# Patient Record
Sex: FEMALE | Race: WHITE | NOT HISPANIC OR LATINO | Employment: OTHER | URBAN - METROPOLITAN AREA
[De-identification: names, ages, dates, MRNs, and addresses within clinical notes are randomized per-mention and may not be internally consistent; named-entity substitution may affect disease eponyms.]

---

## 2023-11-15 DIAGNOSIS — I10 BENIGN ESSENTIAL HYPERTENSION: ICD-10-CM

## 2023-11-15 DIAGNOSIS — I25.10 ATHEROSCLEROSIS OF NATIVE CORONARY ARTERY OF NATIVE HEART WITHOUT ANGINA PECTORIS: ICD-10-CM

## 2023-11-17 ENCOUNTER — OFFICE VISIT (OUTPATIENT)
Dept: CARDIOLOGY | Facility: CLINIC | Age: 83
End: 2023-11-17
Payer: MEDICARE

## 2023-11-17 VITALS
WEIGHT: 203 LBS | HEIGHT: 63 IN | TEMPERATURE: 97.3 F | BODY MASS INDEX: 35.97 KG/M2 | HEART RATE: 62 BPM | DIASTOLIC BLOOD PRESSURE: 72 MMHG | SYSTOLIC BLOOD PRESSURE: 120 MMHG

## 2023-11-17 DIAGNOSIS — E78.2 MIXED HYPERLIPIDEMIA: ICD-10-CM

## 2023-11-17 DIAGNOSIS — I25.10 ATHEROSCLEROSIS OF NATIVE CORONARY ARTERY OF NATIVE HEART WITHOUT ANGINA PECTORIS: Primary | ICD-10-CM

## 2023-11-17 DIAGNOSIS — R09.89 BRUIT OF RIGHT CAROTID ARTERY: ICD-10-CM

## 2023-11-17 DIAGNOSIS — I10 BENIGN ESSENTIAL HYPERTENSION: ICD-10-CM

## 2023-11-17 DIAGNOSIS — Z95.5 PRESENCE OF DRUG COATED STENT IN LAD CORONARY ARTERY: ICD-10-CM

## 2023-11-17 DIAGNOSIS — Z78.9 NONSMOKER: ICD-10-CM

## 2023-11-17 DIAGNOSIS — R53.82 CHRONIC FATIGUE: ICD-10-CM

## 2023-11-17 PROBLEM — R53.83 FATIGUE: Status: ACTIVE | Noted: 2023-11-17

## 2023-11-17 PROBLEM — E04.1 THYROID NODULE: Status: ACTIVE | Noted: 2023-11-17

## 2023-11-17 PROBLEM — I35.9 AORTIC VALVE CALCIFICATION: Status: ACTIVE | Noted: 2023-11-17

## 2023-11-17 PROBLEM — R41.82 ALTERED MENTAL STATUS: Status: ACTIVE | Noted: 2020-06-19

## 2023-11-17 PROBLEM — G47.30 SLEEP APNEA: Status: ACTIVE | Noted: 2023-11-17

## 2023-11-17 PROBLEM — R00.1 SINUS BRADYCARDIA: Status: ACTIVE | Noted: 2023-11-17

## 2023-11-17 PROBLEM — E78.5 HYPERLIPIDEMIA: Status: ACTIVE | Noted: 2023-11-17

## 2023-11-17 PROBLEM — F33.9 RECURRENT MAJOR DEPRESSIVE DISORDER (CMS-HCC): Status: ACTIVE | Noted: 2023-11-17

## 2023-11-17 PROCEDURE — 1036F TOBACCO NON-USER: CPT | Performed by: INTERNAL MEDICINE

## 2023-11-17 PROCEDURE — 3074F SYST BP LT 130 MM HG: CPT | Performed by: INTERNAL MEDICINE

## 2023-11-17 PROCEDURE — 1159F MED LIST DOCD IN RCRD: CPT | Performed by: INTERNAL MEDICINE

## 2023-11-17 PROCEDURE — 99214 OFFICE O/P EST MOD 30 MIN: CPT | Performed by: INTERNAL MEDICINE

## 2023-11-17 PROCEDURE — 93000 ELECTROCARDIOGRAM COMPLETE: CPT | Performed by: INTERNAL MEDICINE

## 2023-11-17 PROCEDURE — 3078F DIAST BP <80 MM HG: CPT | Performed by: INTERNAL MEDICINE

## 2023-11-17 PROCEDURE — 1160F RVW MEDS BY RX/DR IN RCRD: CPT | Performed by: INTERNAL MEDICINE

## 2023-11-17 RX ORDER — ALPHA LIPOIC ACID 100 MG
10 CAPSULE ORAL DAILY
COMMUNITY

## 2023-11-17 RX ORDER — POTASSIUM CHLORIDE 750 MG/1
1 TABLET, EXTENDED RELEASE ORAL EVERY OTHER DAY
COMMUNITY
Start: 2022-07-26

## 2023-11-17 RX ORDER — ROSUVASTATIN CALCIUM 40 MG/1
40 TABLET, COATED ORAL DAILY
COMMUNITY
Start: 2021-11-16 | End: 2023-11-17 | Stop reason: SDUPTHER

## 2023-11-17 RX ORDER — ROSUVASTATIN CALCIUM 40 MG/1
40 TABLET, COATED ORAL DAILY
Qty: 90 TABLET | Refills: 3 | Status: SHIPPED | OUTPATIENT
Start: 2023-11-17 | End: 2024-11-16

## 2023-11-17 RX ORDER — DILTIAZEM HYDROCHLORIDE 180 MG/1
180 CAPSULE, EXTENDED RELEASE ORAL DAILY
Qty: 90 CAPSULE | Refills: 3 | Status: SHIPPED | OUTPATIENT
Start: 2023-11-17 | End: 2024-11-16

## 2023-11-17 RX ORDER — MINERAL OIL
180 ENEMA (ML) RECTAL
COMMUNITY

## 2023-11-17 RX ORDER — DILTIAZEM HYDROCHLORIDE 180 MG/1
1 CAPSULE, EXTENDED RELEASE ORAL DAILY
COMMUNITY
Start: 2022-02-06 | End: 2023-11-17 | Stop reason: SDUPTHER

## 2023-11-17 RX ORDER — BUSPIRONE HYDROCHLORIDE 10 MG/1
1 TABLET ORAL 2 TIMES DAILY
COMMUNITY
Start: 2017-10-02

## 2023-11-17 RX ORDER — MULTIVITAMIN
1 TABLET ORAL
COMMUNITY
Start: 2012-01-06

## 2023-11-17 RX ORDER — LOSARTAN POTASSIUM 50 MG/1
50 TABLET ORAL
Qty: 90 TABLET | Refills: 3 | Status: SHIPPED | OUTPATIENT
Start: 2023-11-17 | End: 2023-12-21 | Stop reason: WASHOUT

## 2023-11-17 RX ORDER — CALCIUM CARBONATE/VITAMIN D3 600MG-5MCG
1 TABLET ORAL 2 TIMES DAILY
COMMUNITY
Start: 2012-01-06

## 2023-11-17 RX ORDER — NITROGLYCERIN 0.4 MG/1
0.4 TABLET SUBLINGUAL EVERY 5 MIN PRN
COMMUNITY
Start: 2012-01-06

## 2023-11-17 RX ORDER — FLUOXETINE HYDROCHLORIDE 20 MG/1
20 CAPSULE ORAL
COMMUNITY
Start: 2012-04-09

## 2023-11-17 RX ORDER — LOSARTAN POTASSIUM 50 MG/1
50 TABLET ORAL
COMMUNITY
End: 2023-11-17 | Stop reason: SDUPTHER

## 2023-11-17 RX ORDER — FUROSEMIDE 20 MG/1
1 TABLET ORAL EVERY OTHER DAY
COMMUNITY
Start: 2022-07-26

## 2023-11-17 RX ORDER — ASPIRIN 81 MG/1
81 TABLET ORAL DAILY
Qty: 90 TABLET | Refills: 3 | Status: SHIPPED | OUTPATIENT
Start: 2023-11-17 | End: 2024-11-16

## 2023-11-17 RX ORDER — ASPIRIN 81 MG/1
1 TABLET ORAL DAILY
COMMUNITY
End: 2023-11-17 | Stop reason: SDUPTHER

## 2023-11-17 RX ORDER — FAMOTIDINE 20 MG/1
20 TABLET, FILM COATED ORAL 2 TIMES DAILY
COMMUNITY
Start: 2017-11-07

## 2023-11-17 RX ORDER — ALPRAZOLAM 0.25 MG/1
0.25 TABLET ORAL 3 TIMES DAILY
COMMUNITY
Start: 2012-01-06

## 2023-11-17 ASSESSMENT — ENCOUNTER SYMPTOMS
DYSURIA: 0
FATIGUE: 1
ARTHRALGIAS: 1
HEMATURIA: 0
ACTIVITY CHANGE: 1
NEUROLOGICAL NEGATIVE: 1
EYES NEGATIVE: 1
PSYCHIATRIC NEGATIVE: 1
FREQUENCY: 1
CARDIOVASCULAR NEGATIVE: 1
RESPIRATORY NEGATIVE: 1
GASTROINTESTINAL NEGATIVE: 1
HEMATOLOGIC/LYMPHATIC NEGATIVE: 1
CHILLS: 0
FEVER: 0

## 2023-11-17 ASSESSMENT — PATIENT HEALTH QUESTIONNAIRE - PHQ9
1. LITTLE INTEREST OR PLEASURE IN DOING THINGS: NOT AT ALL
SUM OF ALL RESPONSES TO PHQ9 QUESTIONS 1 AND 2: 0
2. FEELING DOWN, DEPRESSED OR HOPELESS: NOT AT ALL

## 2023-11-17 NOTE — PROGRESS NOTES
Patient:  Bess Kathleen  YOB: 1940  MRN: [id number]       Chief Complaint/Active Symptoms:       Bess Kathleen is a 83 y.o. female who returns today for cardiac follow-up.    Here for annual follow-up. No hospitalizations or testing other than labs in the past year.     No chest pain or discomfort. No shortness of breath. Does not exercise. No edema, no palpitations, no dizziness or lightheadedness.     No questions or concerns. They are looking for a cardiologist closer to their home in [place].  Patient's daughter brought lab work from her [facility] physician that included a CMP lipid panel and CBC.  No concerning abnormalities were present in those labs    Review of Systems   Constitutional:  Positive for activity change and fatigue. Negative for chills and fever.   HENT:  Positive for hearing loss.    Eyes: Negative.    Respiratory: Negative.     Cardiovascular: Negative.    Gastrointestinal: Negative.    Genitourinary:  Positive for frequency. Negative for dysuria and hematuria.   Musculoskeletal:  Positive for arthralgias.   Skin: Negative.    Neurological: Negative.    Hematological: Negative.    Psychiatric/Behavioral: Negative.     All other systems reviewed and are negative.      Objective:     Vitals:    11/17/23 1403   BP: 120/72   Pulse: 62   Temp: 36.3 °C (97.3 °F)       Vitals:    11/17/23 1403   Weight: 92.1 kg (203 lb)       Allergies:     No Known Allergies       Medications:     Current Outpatient Medications   Medication Instructions    alpha lipoic acid 10 mg, oral, Daily    ALPRAZolam (XANAX) 0.25 mg, oral, 3 times daily    aspirin 81 mg, oral, Daily    busPIRone (Buspar) 10 mg tablet 1 tablet, oral, 2 times daily    calcium carbonate-vitamin D3 600 mg-5 mcg (200 unit) tablet 1 tablet, oral, 2 times daily    DILT- mg, oral, Daily    famotidine (PEPCID) 20 mg, oral, 2 times daily    fexofenadine (ALLEGRA ALLERGY) 180 mg, oral, Daily RT    FLUoxetine  "(PROZAC) 20 mg, oral, Daily RT    furosemide (Lasix) 20 mg tablet 1 tablet, oral, Every other day    losartan (COZAAR) 50 mg, oral, Daily RT    multivitamin tablet 1 tablet, oral, Daily RT    nitroglycerin (NITROSTAT) 0.4 mg, sublingual, Every 5 min PRN    potassium chloride CR 10 mEq ER tablet 1 tablet, oral, Every other day    rosuvastatin (CRESTOR) 40 mg, oral, Daily       Physical Examination:   GENERAL:  Well developed, well nourished, in no acute distress.  HEENT: NC AT, EOMI with anicteric sclera  NECK: Range of motion with mild kyphosis, no JVD, soft right carotid bruit   CHEST:  Symmetric and nontender.  LUNGS:  Clear to auscultation bilaterally, normal respiratory effort.  HEART:  PMI is nondisplaced. RRR with normal S1 and S2, no S3, no mumur or rub. No  abdominal bruits  ABDOMEN: Soft, NT, ND without palpable organomegaly or bruits  EXTREMITIES:  Warm with good color, no clubbing or cyanosis.  There is no edema noted.  PERIPHERAL VASCULAR:  Pulses present and equally palpable; 2+ throughout.  MUSCULOSKELETAL: Arthritic changes present  NEURO/PSYCH:  Alert and oriented times three with approppriate behavior and responses. Nonfocal motor examination with normal gait and ambulation  Lymph: No significant palpable lymphadenopathy  Skin: no rash or lesions on exposed skin or reported.    Lab:     CBC:   No results found for: \"WBC\", \"RBC\", \"HGB\", \"HCT\", \"PLT\"     CMP:    No results found for: \"NA\", \"K\", \"CL\", \"CO2\", \"BUN\", \"CREATININE\", \"AGRATIO\", \"GLUCOSE\", \"GLU\", \"CALCIUM\"    Magnesium:    No results found for: \"MG\"    Lipid Profile:    No results found for: \"CHLPL\", \"TRIG\", \"HDL\", \"LDLCALC\", \"LDLDIRECT\"    TSH:    No results found for: \"TSH\"    BNP:   No results found for: \"BNP\"     PT/INR:    No results found for: \"PROTIME\", \"INR\"    HgBA1c:    No results found for: \"HGBA1C\"    BMP:  No results found for: \"NA\", \"K\", \"CL\", \"CO2\", \"BUN\", \"CREATININE\", \"GLU\"    Cardiac Enzymes:    No results found for: " "\"TROPHS\"    Hepatic Function Panel:    No results found for: \"ALKPHOS\", \"ALT\", \"AST\", \"PROT\", \"BILITOT\", \"BILIDIR\"    Labs reviewed that showed no significant anemia normal chemistry and lipid panel at goal.  Diagnostic Studies:     No echocardiogram results found for the past 12 months    No nuclear medicine results found for the past 12 months    No valid procedures specified.    EKG:   No results found for: \"EKG\"    Radiology:     No orders to display         ASSESSMENT     Problem List Items Addressed This Visit       Atherosclerosis of native coronary artery of native heart without angina pectoris - Primary    Relevant Medications    nitroglycerin (Nitrostat) 0.4 mg SL tablet    DILT- mg 24 hr capsule    losartan (Cozaar) 50 mg tablet    aspirin 81 mg EC tablet    rosuvastatin (Crestor) 40 mg tablet    Benign essential hypertension    Relevant Medications    DILT- mg 24 hr capsule    losartan (Cozaar) 50 mg tablet    Other Relevant Orders    ECG 12 lead (Clinic Performed) (Completed)    Bruit of right carotid artery    Fatigue    Hyperlipidemia    Relevant Medications    rosuvastatin (Crestor) 40 mg tablet    Presence of drug coated stent in LAD coronary artery    Nonsmoker       PLAN     1.  Coronary artery disease stable without angina pectoris and history of LAD stenting.  Continue conservative medical therapy and risk factor modification.  2.  Benign essential hypertension.  Blood pressures are controlled on her current medical regimen continue the same.  3.  Mixed hyperlipidemia.  LDL cholesterol is at goal so we will continue her current dose of statin.  Reinforced diet, exercise and weight loss.  4.  Right carotid bruit.  Very soft minor bruit present on examination today with no history of stroke or TIA.  Would follow clinically.  He can have a repeat carotid duplex sometime in the next several years in the absence of any symptoms.  5.  Chronic fatigue.  I believe this is multifactorial and " exacerbated by her sedentary lifestyle.  We have encouraged her to be more active.  6.  Tobacco and weight status.  The patient is a non-smoker but moderately obese.  We have encouraged a heart healthy Mediterranean diet.    I will see the patient in follow-up in a years time.  We have given them several suggestions of physicians that can be seen in the [place] area the request for a cardiologist closer to their home

## 2023-11-21 RX ORDER — DILTIAZEM HYDROCHLORIDE 180 MG/1
180 CAPSULE, EXTENDED RELEASE ORAL DAILY
Qty: 90 CAPSULE | Refills: 3 | Status: SHIPPED | OUTPATIENT
Start: 2023-11-21

## 2023-12-15 ENCOUNTER — TELEPHONE (OUTPATIENT)
Dept: PRIMARY CARE | Facility: CLINIC | Age: 83
End: 2023-12-15
Payer: MEDICARE

## 2023-12-15 DIAGNOSIS — I10 BENIGN ESSENTIAL HYPERTENSION: ICD-10-CM

## 2023-12-15 NOTE — TELEPHONE ENCOUNTER
Patient dtr called in confused regarding patients ARB medication.  The patient used to be on Valsartan 80 mg 1QD.  She was seen 11/17/2023, for the 1st time since the EPIC change.  The patient did not bring her med list to that appt and she did not know her meds.  Losartan was put in her chart in place of Valsartan.  A new rx for Losartan was sent to [facility].  She also still had some Valsartan at home.   She has been taking both .  Advised to stop the Valsartan since Losartan is on her current med list.  Do you have a preference in which she should continue?  We can call Valeria [phone], her dtr.

## 2023-12-18 NOTE — TELEPHONE ENCOUNTER
Incoming call from Valeria, patient's daughter. Valeria states that Bess has been taking Valsartan and Losartan both for the last 3 weeks. The patient prefers the Valsartan and would like to continue on it and discontinue the losartan if possible. Valeria would like to know if taking both of these medications could have caused any serious issues, she would like to know if blood work should be done?

## 2023-12-20 NOTE — TELEPHONE ENCOUNTER
Pt's daughter called again, could you verify some meds with her please when you get time. Thank  you.     [phone]

## 2023-12-21 RX ORDER — VALSARTAN 80 MG/1
80 TABLET ORAL DAILY
Qty: 90 TABLET | Refills: 1 | Status: SHIPPED | OUTPATIENT
Start: 2023-12-21 | End: 2024-05-30

## 2023-12-21 RX ORDER — VALSARTAN 80 MG/1
80 TABLET ORAL DAILY
COMMUNITY
End: 2023-12-21 | Stop reason: SDUPTHER

## 2024-04-12 ENCOUNTER — TELEPHONE (OUTPATIENT)
Dept: PRIMARY CARE | Facility: CLINIC | Age: 84
End: 2024-04-12
Payer: MEDICARE

## 2024-04-12 NOTE — TELEPHONE ENCOUNTER
Pts daughter Valeria left  stating pt is having a hard time remembering if she is taking her meds. She said she does not remember if she took her Valsartan for the day and wants to know if ok to take 1 just in case or should they skip and take tomorrow.     Valeria [phone]

## 2024-05-28 DIAGNOSIS — I10 BENIGN ESSENTIAL HYPERTENSION: ICD-10-CM

## 2024-05-30 RX ORDER — VALSARTAN 80 MG/1
80 TABLET ORAL DAILY
Qty: 90 TABLET | Refills: 1 | Status: SHIPPED | OUTPATIENT
Start: 2024-05-30 | End: 2024-11-26

## 2024-06-26 DIAGNOSIS — E87.6 HYPOKALEMIA: ICD-10-CM

## 2024-06-26 DIAGNOSIS — R60.0 LEG EDEMA: Primary | ICD-10-CM

## 2024-06-27 RX ORDER — POTASSIUM CHLORIDE 750 MG/1
10 TABLET, FILM COATED, EXTENDED RELEASE ORAL EVERY OTHER DAY
Qty: 45 TABLET | Refills: 1 | Status: SHIPPED | OUTPATIENT
Start: 2024-06-27 | End: 2024-12-24

## 2024-06-27 RX ORDER — FUROSEMIDE 20 MG/1
20 TABLET ORAL EVERY OTHER DAY
Qty: 45 TABLET | Refills: 1 | Status: SHIPPED | OUTPATIENT
Start: 2024-06-27 | End: 2024-12-24

## 2024-09-16 ENCOUNTER — TELEPHONE (OUTPATIENT)
Dept: PRIMARY CARE | Facility: CLINIC | Age: 84
End: 2024-09-16
Payer: MEDICARE

## 2024-09-16 NOTE — TELEPHONE ENCOUNTER
Stating she sent in auth to send medical records to new cardiologist advised we do not have on file gave fax number and said they will send ing

## 2024-10-25 ENCOUNTER — APPOINTMENT (OUTPATIENT)
Dept: CARDIOLOGY | Facility: CLINIC | Age: 84
End: 2024-10-25
Payer: MEDICARE

## 2024-11-11 DIAGNOSIS — I25.10 ATHEROSCLEROSIS OF NATIVE CORONARY ARTERY OF NATIVE HEART WITHOUT ANGINA PECTORIS: ICD-10-CM

## 2024-11-11 DIAGNOSIS — I10 BENIGN ESSENTIAL HYPERTENSION: ICD-10-CM

## 2024-11-14 NOTE — TELEPHONE ENCOUNTER
I tried to contact pt with primary # and it was a business so I tried alternate # twice and she could not hear me. I will try again later.

## 2024-11-15 RX ORDER — DILTIAZEM HYDROCHLORIDE 180 MG/1
180 CAPSULE, EXTENDED RELEASE ORAL DAILY
Refills: 0 | OUTPATIENT
Start: 2024-11-15